# Patient Record
Sex: MALE | Race: OTHER | NOT HISPANIC OR LATINO | URBAN - METROPOLITAN AREA
[De-identification: names, ages, dates, MRNs, and addresses within clinical notes are randomized per-mention and may not be internally consistent; named-entity substitution may affect disease eponyms.]

---

## 2020-05-29 ENCOUNTER — EMERGENCY (EMERGENCY)
Facility: HOSPITAL | Age: 45
LOS: 1 days | Discharge: HOME | End: 2020-05-29
Attending: EMERGENCY MEDICINE | Admitting: EMERGENCY MEDICINE
Payer: MEDICAID

## 2020-05-29 VITALS
DIASTOLIC BLOOD PRESSURE: 97 MMHG | SYSTOLIC BLOOD PRESSURE: 138 MMHG | RESPIRATION RATE: 16 BRPM | TEMPERATURE: 98 F | OXYGEN SATURATION: 98 % | HEART RATE: 78 BPM

## 2020-05-29 VITALS — DIASTOLIC BLOOD PRESSURE: 91 MMHG | SYSTOLIC BLOOD PRESSURE: 141 MMHG

## 2020-05-29 PROCEDURE — 99284 EMERGENCY DEPT VISIT MOD MDM: CPT | Mod: 25

## 2020-05-29 PROCEDURE — 93010 ELECTROCARDIOGRAM REPORT: CPT

## 2020-05-29 RX ORDER — ASPIRIN/CALCIUM CARB/MAGNESIUM 324 MG
162 TABLET ORAL ONCE
Refills: 0 | Status: COMPLETED | OUTPATIENT
Start: 2020-05-29 | End: 2020-05-29

## 2020-05-29 NOTE — ED ADULT TRIAGE NOTE - CHIEF COMPLAINT QUOTE
c/o left sided chest pain radiating to back x 2 weeks. worsening today. not worse on exertion. denies associated symptoms. denies medical hx.

## 2020-05-30 LAB
ALBUMIN SERPL ELPH-MCNC: 4.4 G/DL — SIGNIFICANT CHANGE UP (ref 3.3–5)
ALP SERPL-CCNC: 66 U/L — SIGNIFICANT CHANGE UP (ref 40–120)
ALT FLD-CCNC: 11 U/L — SIGNIFICANT CHANGE UP (ref 4–41)
ANION GAP SERPL CALC-SCNC: 10 MMO/L — SIGNIFICANT CHANGE UP (ref 7–14)
AST SERPL-CCNC: 29 U/L — SIGNIFICANT CHANGE UP (ref 4–40)
BASOPHILS # BLD AUTO: 0.03 K/UL — SIGNIFICANT CHANGE UP (ref 0–0.2)
BASOPHILS NFR BLD AUTO: 0.4 % — SIGNIFICANT CHANGE UP (ref 0–2)
BILIRUB SERPL-MCNC: < 0.2 MG/DL — LOW (ref 0.2–1.2)
BUN SERPL-MCNC: 13 MG/DL — SIGNIFICANT CHANGE UP (ref 7–23)
CALCIUM SERPL-MCNC: 9.4 MG/DL — SIGNIFICANT CHANGE UP (ref 8.4–10.5)
CHLORIDE SERPL-SCNC: 102 MMOL/L — SIGNIFICANT CHANGE UP (ref 98–107)
CO2 SERPL-SCNC: 28 MMOL/L — SIGNIFICANT CHANGE UP (ref 22–31)
CREAT SERPL-MCNC: 1.07 MG/DL — SIGNIFICANT CHANGE UP (ref 0.5–1.3)
EOSINOPHIL # BLD AUTO: 0.11 K/UL — SIGNIFICANT CHANGE UP (ref 0–0.5)
EOSINOPHIL NFR BLD AUTO: 1.5 % — SIGNIFICANT CHANGE UP (ref 0–6)
GLUCOSE SERPL-MCNC: 90 MG/DL — SIGNIFICANT CHANGE UP (ref 70–99)
HCT VFR BLD CALC: 41.7 % — SIGNIFICANT CHANGE UP (ref 39–50)
HGB BLD-MCNC: 14 G/DL — SIGNIFICANT CHANGE UP (ref 13–17)
IMM GRANULOCYTES NFR BLD AUTO: 0.3 % — SIGNIFICANT CHANGE UP (ref 0–1.5)
LYMPHOCYTES # BLD AUTO: 3.21 K/UL — SIGNIFICANT CHANGE UP (ref 1–3.3)
LYMPHOCYTES # BLD AUTO: 42.5 % — SIGNIFICANT CHANGE UP (ref 13–44)
MCHC RBC-ENTMCNC: 29.7 PG — SIGNIFICANT CHANGE UP (ref 27–34)
MCHC RBC-ENTMCNC: 33.6 % — SIGNIFICANT CHANGE UP (ref 32–36)
MCV RBC AUTO: 88.3 FL — SIGNIFICANT CHANGE UP (ref 80–100)
MONOCYTES # BLD AUTO: 0.51 K/UL — SIGNIFICANT CHANGE UP (ref 0–0.9)
MONOCYTES NFR BLD AUTO: 6.8 % — SIGNIFICANT CHANGE UP (ref 2–14)
NEUTROPHILS # BLD AUTO: 3.67 K/UL — SIGNIFICANT CHANGE UP (ref 1.8–7.4)
NEUTROPHILS NFR BLD AUTO: 48.5 % — SIGNIFICANT CHANGE UP (ref 43–77)
NRBC # FLD: 0 K/UL — SIGNIFICANT CHANGE UP (ref 0–0)
PLATELET # BLD AUTO: 277 K/UL — SIGNIFICANT CHANGE UP (ref 150–400)
PMV BLD: 9.5 FL — SIGNIFICANT CHANGE UP (ref 7–13)
POTASSIUM SERPL-MCNC: 3.9 MMOL/L — SIGNIFICANT CHANGE UP (ref 3.5–5.3)
POTASSIUM SERPL-SCNC: 3.9 MMOL/L — SIGNIFICANT CHANGE UP (ref 3.5–5.3)
PROT SERPL-MCNC: 7.1 G/DL — SIGNIFICANT CHANGE UP (ref 6–8.3)
RBC # BLD: 4.72 M/UL — SIGNIFICANT CHANGE UP (ref 4.2–5.8)
RBC # FLD: 12.9 % — SIGNIFICANT CHANGE UP (ref 10.3–14.5)
SODIUM SERPL-SCNC: 140 MMOL/L — SIGNIFICANT CHANGE UP (ref 135–145)
TROPONIN T, HIGH SENSITIVITY: < 6 NG/L — SIGNIFICANT CHANGE UP (ref ?–14)
WBC # BLD: 7.55 K/UL — SIGNIFICANT CHANGE UP (ref 3.8–10.5)
WBC # FLD AUTO: 7.55 K/UL — SIGNIFICANT CHANGE UP (ref 3.8–10.5)

## 2020-05-30 PROCEDURE — 71046 X-RAY EXAM CHEST 2 VIEWS: CPT | Mod: 26

## 2020-05-30 RX ADMIN — Medication 162 MILLIGRAM(S): at 00:01

## 2020-05-30 NOTE — ED PROVIDER NOTE - NSFOLLOWUPINSTRUCTIONS_ED_ALL_ED_FT
Follow up with your primary doctor and a cardiologist as soon as possible. You can see Dr. Shanon Tong at  [phone] [place] [address] [address] [facility] [address] or if he does not take your insurance call  and ask for a Cardiologist. Advance activity as tolerated.  Continue all previously prescribed medications as directed.  Follow up with your primary care physician in 48-72 hours- bring copies of your results.  Return to the ER for worsening or persistent symptoms, and/or ANY NEW OR CONCERNING SYMPTOMS. If you have issues obtaining follow up, please call: [phone] to obtain a doctor or specialist who takes your insurance in your area.  You may call [phone] to make an appointment with the internal medicine clinic.

## 2020-05-30 NOTE — ED PROVIDER NOTE - CLINICAL SUMMARY MEDICAL DECISION MAKING FREE TEXT BOX
43 Y/O M denies PMH or PSH, non-smoker states that he has had L sided Chest pressure that has radiated to the back for the past 2 weeks. Pt denies change with exertion, pt states the pain is always present but will vary in intensity. EKG is normal, will check Trop to R/O ACS, Labs to eval for anemia or electrolyte disturbance. CXR ordered to eval for pneumothorax or other pulmonary findings. If neg will D/C with cardiology F/U.

## 2020-05-30 NOTE — ED PROVIDER NOTE - ATTENDING CONTRIBUTION TO CARE
44M p/w L side CP rad around to the back, pressure like, initial though it was muscular.  No P/P factors, didn't take meds.  Nonsmoker.  No prev cardiac w/u.  Never had pain like this.  previous h/o colorectal fistula.  EKG nonischemic.  Mild ttp to palpation, no rash.  Low risk CP - plan check labs, trops, CXR rx ASA; if all neg can f/u cards RTER for new/worsening symptoms.  VS:  unremarkable    GEN - NAD;   well appearing;   A+O x3   HEAD - NC/AT     ENT - PEERL, EOMI, mucous membranes    moist , no discharge      NECK: Neck supple, non-tender without lymphadenopathy, no masses, no JVD  PULM - CTA b/l,  symmetric breath sounds  COR -  normal heart sounds    ABD - , ND, NT, soft,  BACK - no CVA tenderness, nontender spine     EXTREMS - no edema, no deformity, warm and well perfused    SKIN - no rash    or bruising      NEUROLOGIC - alert, face symmetric, speech fluent, sensation nl, motor no focal deficit.

## 2020-05-30 NOTE — ED PROVIDER NOTE - OBJECTIVE STATEMENT
45 Y/O M denies PMH or PSH, non-smoker states that he has had L sided Chest pressure that has radiated to the back for the past 2 weeks. Pt denies change with exertion, pt states the pain is always present but will vary in intensity. Pt states the pain is currently mild but was more severe this afternoon. Pt is a non-smoker, states he does not know about his family history because he is not in touch with them. Pt denies SOB or H/O blood clots. PSH Colorectal fistula removal. Pt denies any other sx or acute complaints.

## 2020-05-30 NOTE — ED ADULT NURSE NOTE - NSIMPLEMENTINTERV_GEN_ALL_ED
Implemented All Universal Safety Interventions:  [place] to call system. Call bell, personal items and telephone within reach. Instruct patient to call for assistance. Room bathroom lighting operational. Non-slip footwear when patient is off stretcher. Physically safe environment: no spills, clutter or unnecessary equipment. Stretcher in lowest position, wheels locked, appropriate side rails in place.

## 2020-05-30 NOTE — ED PROVIDER NOTE - PATIENT PORTAL LINK FT
You can access the FollowMyHealth Patient Portal offered by [facility] by registering at the following website: [email or web address]. By joining [email or web address] FollowMyHealth portal, you will also be able to view your health information using other applications (apps) compatible with our system.

## 2020-11-06 PROBLEM — Z78.9 OTHER SPECIFIED HEALTH STATUS: Chronic | Status: ACTIVE | Noted: 2020-05-30

## 2020-11-08 PROBLEM — Z00.00 ENCOUNTER FOR PREVENTIVE HEALTH EXAMINATION: Status: ACTIVE | Noted: 2020-11-08

## 2020-11-09 ENCOUNTER — APPOINTMENT (OUTPATIENT)
Dept: ORTHOPEDIC SURGERY | Facility: CLINIC | Age: 45
End: 2020-11-09
Payer: MEDICAID

## 2020-11-09 VITALS — BODY MASS INDEX: 22.82 KG/M2 | WEIGHT: 142 LBS | HEIGHT: 66 IN

## 2020-11-09 VITALS — HEIGHT: 66 IN | BODY MASS INDEX: 22.92 KG/M2

## 2020-11-09 DIAGNOSIS — Z78.9 OTHER SPECIFIED HEALTH STATUS: ICD-10-CM

## 2020-11-09 DIAGNOSIS — M25.561 PAIN IN RIGHT KNEE: ICD-10-CM

## 2020-11-09 DIAGNOSIS — M25.562 PAIN IN RIGHT KNEE: ICD-10-CM

## 2020-11-09 PROCEDURE — 99072 ADDL SUPL MATRL&STAF TM PHE: CPT

## 2020-11-09 PROCEDURE — 99204 OFFICE O/P NEW MOD 45 MIN: CPT

## 2020-11-09 PROCEDURE — 73564 X-RAY EXAM KNEE 4 OR MORE: CPT | Mod: RT

## 2020-11-09 RX ORDER — MELOXICAM 15 MG/1
15 TABLET ORAL
Qty: 30 | Refills: 2 | Status: ACTIVE | COMMUNITY
Start: 2020-11-09 | End: 1900-01-01

## 2020-11-09 RX ORDER — CLOTRIMAZOLE 10 MG/G
1 CREAM TOPICAL
Qty: 28 | Refills: 0 | Status: ACTIVE | COMMUNITY
Start: 2020-09-22

## 2020-11-09 NOTE — HISTORY OF PRESENT ILLNESS
[de-identified] : This is very nice 45-year-old gentleman experiencing bilateral knee pain, which is mild in intensity. The pain mildly limits activities of daily living. Walking tolerance is not reduced.  He has pain in the anterior aspect of both knees.  He also complains of a right knee anterior mass that bothers him.  The masses been present for more than 2 years but it was not present before this.  There is no inciting event that caused this mass.  He feels like the mass is not getting bigger but it has a great anxiety about the mass being present and wants it removed.  He does not take any medications for the knee pain.  He has not tried physical therapy.  Has not tried bracing.  He does not use a cane or walker.  The patient denies any radiation of the pain to the feet and it is not associated with numbness, tingling, or weakness.

## 2020-11-09 NOTE — PHYSICAL EXAM
[de-identified] : Patient is well nourished, well-developed, in no acute distress, with appropriate mood and affect. The patient is oriented to time, place, and person. Respirations are even and unlabored. Gait evaluation does not reveal a limp. There is no inguinal adenopathy. The right limb is well-perfused and showed 2+ dp/pt pulses, without skin lesions, shows a grossly normal motor and sensory examination. Right knee motion is painless and the knee moves from 0 to 135 degrees. The knee is stable within that range-of-motion to AP and ML stress with a 1A Lachman, negative anterior or posterior drawer and no instability to varus or valgus stress. The alignment of the knee is 5 degrees varus. No effusion or crepitus is noted. No tenderness to palpation about the medial or lateral joint line, medial or lateral tibial plateau, medial or lateral femoral condyle, medial or lateral patellar facets, superior or inferior pole of the patella. Jayce's is negative.  A 1.5 cm mass is palpable in the anterior aspect of the knee and this prepatellar space.  The mass is mobile.  There is no tenderness palpation over the mass.  There is no overlying skin changes.  Muscle strength is normal. Pedal pulses are palpable. Hip examination was negative. The left limb is well-perfused and showed 2+ dp/pt pulses, without skin lesions, shows a grossly normal motor and sensory examination. Left knee motion is painless and the knee moves from 0 to 135 degrees. The knee is stable within that range-of-motion to AP and ML stress with a 1A Lachman, negative anterior or posterior drawer and no instability to varus or valgus stress. The alignment of the knee is 5 degrees varus. No effusion or crepitus is noted. No tenderness to palpation about the medial or lateral joint line, medial or lateral tibial plateau, medial or lateral femoral condyle, medial or lateral patellar facets, superior or inferior pole of the patella. Jayce's is negative. Muscle strength is normal. Pedal pulses are palpable. Hip examination was negative. [de-identified] : Long standing knee, AP knee, lateral knee, and patellar views of the bilateral knee were ordered and taken in the office and demonstrate no evidence of degenerative joint disease of the knee, fractures, intra-articular pathology.\par \par Patient brings with him an MRI of the right knee.  This demonstrates moderate medial compartment degenerative changes and meniscal fraying in the medial compartment.  The mass not visible on the MRI.\par \par The patient brings with him an MRI of the left knee which demonstrates mild degenerative changes at the free edge of the meniscus on the medial side.

## 2020-11-09 NOTE — DISCUSSION/SUMMARY
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  Also has an anterior knee mass.  An extensive discussion was conducted on the natural history of the disease and the variety of surgical and non-surgical options available to the patient including, but not limited to non-steroidal anti-inflammatory medications, steroid injections, physical therapy, maintenance of ideal body weight, and reduction of activity.  I recommended and prescribed a course of Mobic and physical therapy.  The patient is also encouraged to trial a neoprene sleeve knee brace which can be purchased OTC.  The patient is also encouraged to consider use of a cane.  In terms of the right knee he would like to proceed with a right knee open excision of the mass.  He understands that excising the mass will not improve the pain that he is feeling in the bilateral knees.  However he is worried about this mass and wants to make sure it is not cancer would like it removed.  The patient will schedule an appointment as needed.\par \par The patient is an appropriate candidate for consideration of right knee mass removal. An extensive discussion was conducted of the natural history of the disease and the variety of surgical and non-surgical treatment options available to the patient. A risk/benefit analysis was discussed with the patient reviewing the advantages and disadvantages of surgical intervention at this time. Both the level and length of the patient's pain have made additional conservative treatment measures consisting of NSAIDs, physical therapy, corticosteroids, and/or viscosupplementation contraindicated. A full explanation was given of the nature and the purpose of the procedure and anesthesia, its benefits, possible alternative methods of diagnosis or treatment, the risks involved, the possibility of complications, the foreseeable consequences of the procedure and the possible results of the non-treatment. \par \par No guarantee or assurance was made as to the results that may be obtained. Specifically, the risks were identified to include, but are not limited to the following: Infection, phlebitis, pulmonary embolism, death, paralysis, mass recurrence, pain, stiffness, limp, weakness, uncontrolled bleeding, nerve injury, blood vessel injury, pressure sores, and anesthetic risks. Further discussion was undertaken with the patient about the details of surgical preparation, treatment, and postoperative rehabilitation including medical clearance, autotransfusion, the hospital course, and the postoperative rehabilitation involved. All in all, I feel that this patient is a good candidate for surgical intervention for mass removal.\par \par The patient and I discussed the current SARS-CoV-2 (COVID-19) pandemic which has affected our local hospitals. We discussed that our hospitals treat patients with COVID-19. All efforts will be made to avoid cohorting the patient with diagnosed or suspected COVID-19 patient. They also understand that we will screen them 24-48 hours prior to surgery. Despite our best efforts, there is a potential risk for iatrogenic transmission of COVID-19 to the patient during the perioperative period. Adriana COVID-19 during the perioperative period may increase the patient´s risks of an adverse outcome including postoperative pneumonia, difficulty breathing, requirement for a breathing tube (general endotracheal intubation), and death. The patient is understanding of this risk, and is willing to proceed with surgery at this time.\par

## 2020-12-30 ENCOUNTER — OUTPATIENT (OUTPATIENT)
Dept: OUTPATIENT SERVICES | Facility: HOSPITAL | Age: 45
LOS: 1 days | End: 2020-12-30
Payer: COMMERCIAL

## 2020-12-30 VITALS
TEMPERATURE: 98 F | HEIGHT: 65 IN | SYSTOLIC BLOOD PRESSURE: 155 MMHG | OXYGEN SATURATION: 99 % | RESPIRATION RATE: 18 BRPM | WEIGHT: 145.06 LBS | DIASTOLIC BLOOD PRESSURE: 92 MMHG | HEART RATE: 79 BPM

## 2020-12-30 DIAGNOSIS — Z01.818 ENCOUNTER FOR OTHER PREPROCEDURAL EXAMINATION: ICD-10-CM

## 2020-12-30 DIAGNOSIS — K60.3 ANAL FISTULA: Chronic | ICD-10-CM

## 2020-12-30 DIAGNOSIS — M23.41 LOOSE BODY IN KNEE, RIGHT KNEE: ICD-10-CM

## 2020-12-30 DIAGNOSIS — M79.89 OTHER SPECIFIED SOFT TISSUE DISORDERS: ICD-10-CM

## 2020-12-30 LAB
HCT VFR BLD CALC: 47.9 % — SIGNIFICANT CHANGE UP (ref 39–50)
HGB BLD-MCNC: 16 G/DL — SIGNIFICANT CHANGE UP (ref 13–17)
MCHC RBC-ENTMCNC: 29.7 PG — SIGNIFICANT CHANGE UP (ref 27–34)
MCHC RBC-ENTMCNC: 33.4 GM/DL — SIGNIFICANT CHANGE UP (ref 32–36)
MCV RBC AUTO: 89 FL — SIGNIFICANT CHANGE UP (ref 80–100)
NRBC # BLD: 0 /100 WBCS — SIGNIFICANT CHANGE UP (ref 0–0)
PLATELET # BLD AUTO: 290 K/UL — SIGNIFICANT CHANGE UP (ref 150–400)
RBC # BLD: 5.38 M/UL — SIGNIFICANT CHANGE UP (ref 4.2–5.8)
RBC # FLD: 13 % — SIGNIFICANT CHANGE UP (ref 10.3–14.5)
WBC # BLD: 7.29 K/UL — SIGNIFICANT CHANGE UP (ref 3.8–10.5)
WBC # FLD AUTO: 7.29 K/UL — SIGNIFICANT CHANGE UP (ref 3.8–10.5)

## 2020-12-30 PROCEDURE — 85027 COMPLETE CBC AUTOMATED: CPT

## 2020-12-30 PROCEDURE — G0463: CPT

## 2020-12-30 RX ORDER — SODIUM CHLORIDE 9 MG/ML
3 INJECTION INTRAMUSCULAR; INTRAVENOUS; SUBCUTANEOUS EVERY 8 HOURS
Refills: 0 | Status: DISCONTINUED | OUTPATIENT
Start: 2021-01-13 | End: 2021-01-27

## 2020-12-30 RX ORDER — LIDOCAINE HCL 20 MG/ML
0.2 VIAL (ML) INJECTION ONCE
Refills: 0 | Status: DISCONTINUED | OUTPATIENT
Start: 2021-01-13 | End: 2021-01-27

## 2020-12-30 NOTE — H&P PST ADULT - HISTORY OF PRESENT ILLNESS
This is a  44 y/o Lithuanian speaking male with  no significant PMH c/o right knee mass for several years that has become painful.  Presents today for right knee removal of soft tissue mass. This is a 46 y/o Japanese speaking male with  no significant PMH c/o right knee mass for several years that has become painful.  Presents today for right knee removal of soft tissue mass.

## 2020-12-30 NOTE — H&P PST ADULT - NSICDXPROBLEM_GEN_ALL_CORE_FT
PROBLEM DIAGNOSES  Problem: Soft tissue mass  Assessment and Plan: Right knee removal of soft tissue mass

## 2021-01-10 ENCOUNTER — OUTPATIENT (OUTPATIENT)
Dept: OUTPATIENT SERVICES | Facility: HOSPITAL | Age: 46
LOS: 1 days | End: 2021-01-10
Payer: COMMERCIAL

## 2021-01-10 DIAGNOSIS — Z20.828 CONTACT WITH AND (SUSPECTED) EXPOSURE TO OTHER VIRAL COMMUNICABLE DISEASES: ICD-10-CM

## 2021-01-10 DIAGNOSIS — K60.3 ANAL FISTULA: Chronic | ICD-10-CM

## 2021-01-10 LAB — SARS-COV-2 RNA SPEC QL NAA+PROBE: SIGNIFICANT CHANGE UP

## 2021-01-10 PROCEDURE — U0005: CPT

## 2021-01-10 PROCEDURE — C9803: CPT

## 2021-01-10 PROCEDURE — U0003: CPT

## 2021-01-12 ENCOUNTER — TRANSCRIPTION ENCOUNTER (OUTPATIENT)
Age: 46
End: 2021-01-12

## 2021-01-13 ENCOUNTER — OUTPATIENT (OUTPATIENT)
Dept: OUTPATIENT SERVICES | Facility: HOSPITAL | Age: 46
LOS: 1 days | End: 2021-01-13
Payer: COMMERCIAL

## 2021-01-13 ENCOUNTER — RESULT REVIEW (OUTPATIENT)
Age: 46
End: 2021-01-13

## 2021-01-13 ENCOUNTER — APPOINTMENT (OUTPATIENT)
Dept: ORTHOPEDIC SURGERY | Facility: HOSPITAL | Age: 46
End: 2021-01-13

## 2021-01-13 VITALS
RESPIRATION RATE: 15 BRPM | SYSTOLIC BLOOD PRESSURE: 109 MMHG | HEART RATE: 78 BPM | OXYGEN SATURATION: 100 % | DIASTOLIC BLOOD PRESSURE: 67 MMHG

## 2021-01-13 VITALS
DIASTOLIC BLOOD PRESSURE: 84 MMHG | HEART RATE: 77 BPM | SYSTOLIC BLOOD PRESSURE: 143 MMHG | HEIGHT: 65 IN | TEMPERATURE: 97 F | WEIGHT: 145.06 LBS | RESPIRATION RATE: 18 BRPM | OXYGEN SATURATION: 100 %

## 2021-01-13 DIAGNOSIS — M23.41 LOOSE BODY IN KNEE, RIGHT KNEE: ICD-10-CM

## 2021-01-13 DIAGNOSIS — K60.3 ANAL FISTULA: Chronic | ICD-10-CM

## 2021-01-13 PROCEDURE — 88305 TISSUE EXAM BY PATHOLOGIST: CPT | Mod: 26

## 2021-01-13 PROCEDURE — 27327 EXC THIGH/KNEE LES SC < 3 CM: CPT | Mod: RT

## 2021-01-13 PROCEDURE — 88305 TISSUE EXAM BY PATHOLOGIST: CPT

## 2021-01-13 RX ORDER — NAPROXEN 500 MG/1
500 TABLET ORAL
Qty: 30 | Refills: 0 | Status: ACTIVE | COMMUNITY
Start: 2021-01-13 | End: 1900-01-01

## 2021-01-13 RX ORDER — OXYCODONE 5 MG/1
5 TABLET ORAL
Qty: 40 | Refills: 0 | Status: ACTIVE | COMMUNITY
Start: 2021-01-13 | End: 1900-01-01

## 2021-01-13 RX ORDER — ONDANSETRON 8 MG/1
4 TABLET, FILM COATED ORAL ONCE
Refills: 0 | Status: DISCONTINUED | OUTPATIENT
Start: 2021-01-13 | End: 2021-01-27

## 2021-01-13 RX ORDER — SODIUM CHLORIDE 9 MG/ML
1000 INJECTION, SOLUTION INTRAVENOUS
Refills: 0 | Status: DISCONTINUED | OUTPATIENT
Start: 2021-01-13 | End: 2021-01-27

## 2021-01-13 RX ORDER — OXYCODONE HYDROCHLORIDE 5 MG/1
5 TABLET ORAL ONCE
Refills: 0 | Status: DISCONTINUED | OUTPATIENT
Start: 2021-01-13 | End: 2021-01-13

## 2021-01-13 RX ORDER — CHLORHEXIDINE GLUCONATE 213 G/1000ML
1 SOLUTION TOPICAL ONCE
Refills: 0 | Status: COMPLETED | OUTPATIENT
Start: 2021-01-13 | End: 2021-01-13

## 2021-01-13 RX ORDER — PANTOPRAZOLE 40 MG/1
40 TABLET, DELAYED RELEASE ORAL DAILY
Qty: 14 | Refills: 0 | Status: ACTIVE | COMMUNITY
Start: 2021-01-13 | End: 1900-01-01

## 2021-01-13 RX ORDER — ASPIRIN 81 MG/1
81 TABLET ORAL
Qty: 28 | Refills: 0 | Status: ACTIVE | COMMUNITY
Start: 2021-01-13 | End: 1900-01-01

## 2021-01-13 RX ADMIN — CHLORHEXIDINE GLUCONATE 1 APPLICATION(S): 213 SOLUTION TOPICAL at 11:43

## 2021-01-13 NOTE — ASU DISCHARGE PLAN (ADULT/PEDIATRIC) - CARE PROVIDER_API CALL
Benja Dean)  Orthopedics  [address], [address]  [place] [address]  Phone: [phone]  Fax: [phone]  Follow Up Time:

## 2021-01-13 NOTE — ASU DISCHARGE PLAN (ADULT/PEDIATRIC) - PAIN MANAGEMENT
Prescription called to pharmacy may take tylenol for pain use as directed next dose @ 8pm/Prescription called to pharmacy

## 2021-01-13 NOTE — ASU DISCHARGE PLAN (ADULT/PEDIATRIC) - ASU DC SPECIAL INSTRUCTIONSFT
Follow up with Dr. Dean in 10-14 days. Call office for appointment. Take medications as prescribed, pain Rx called to your pharmacy. Keep dressing clean, dry, and intact until your follow up. Ok to shower, no bathing. Rest, ice, and elevate affected extremity.

## 2021-01-14 ENCOUNTER — NON-APPOINTMENT (OUTPATIENT)
Age: 46
End: 2021-01-14

## 2021-01-26 ENCOUNTER — APPOINTMENT (OUTPATIENT)
Dept: ORTHOPEDIC SURGERY | Facility: CLINIC | Age: 46
End: 2021-01-26
Payer: MEDICAID

## 2021-01-26 VITALS — TEMPERATURE: 97.2 F

## 2021-01-26 DIAGNOSIS — M23.41 LOOSE BODY IN KNEE, RIGHT KNEE: ICD-10-CM

## 2021-01-26 PROCEDURE — 99024 POSTOP FOLLOW-UP VISIT: CPT

## 2021-01-26 NOTE — DISCUSSION/SUMMARY
[de-identified] : The patient is doing well after removal of loose body. Written infectious precautions were reviewed.  Return around the 6 week anniversary from surgery for follow-up evaluation. \par \par The patient returns to the office today for suture removal from the right knee wound. The sutures have been in since surgery. The patient has no complaints. The wound is healing well and is without evidence of infection or wound dehiscence. The wound was prepped with betadine and sutures were removed. Steri strips were placed over the wound and the patient was instructed to leave these in place until they fall off on their own. The patient tolerated the procedure well and there were no immediate complications. The wound edges are well adhered. The patient was instructed to continue to keep it clean.

## 2021-01-26 NOTE — PHYSICAL EXAM
[de-identified] : Patient is well nourished, well-developed, in no acute distress, with appropriate mood and affect. The patient is oriented to time, place, and person. Respirations are even and unlabored. Gait evaluation does not reveal a limp. There is no inguinal adenopathy. Examination of the contralateral knee shows normal range of motion, strength, no tenderness, and intact skin. The affected limb is well-perfused and showed 2+ dp/pt pulses, without skin lesions, shows a grossly normal motor and sensory examination. Knee motion is painless and the knee moves from 0-130 degrees. The knee is stable within that range-of-motion to AP and ML stress with a 1A Lachman, negative anterior or posterior drawer and no instability to varus or valgus stress. The alignment of the knee is neutral. No effusion or crepitus is noted. No tenderness to palpation about the medial or lateral joint line, medial or lateral tibial plateau, medial or lateral femoral condyle, medial or lateral patellar facets, superior or inferior pole of the patella. Jayce's is negative. Muscle strength is normal. Pedal pulses are palpable. Hip examination was negative. Wound healing well.

## 2022-12-15 NOTE — H&P PST ADULT - NS PRO ABUSE SCREEN SUSPICION NEGLECT YN
Detail Level: Simple Size Of Lesion: 1mm Morphology Per Location (Optional): brown papule Body Location Override (Optional - Billing Will Still Be Based On Selected Body Map Location If Applicable): left inferior posterior helix Detail Level: Zone Size Of Lesion In Cm (Optional): 0 Body Location Override (Optional - Billing Will Still Be Based On Selected Body Map Location If Applicable): left back no

## 2024-10-14 NOTE — PRE-ANESTHESIA EVALUATION ADULT - NSANTHOSAYNRD_GEN_A_CORE
October 14, 2024     Patient: Tomas Harris   YOB: 1991   Date of Visit: 10/14/2024       To Whom It May Concern:    It is my medical opinion that Tomas Harris may return to work on 10/17/2024 .    If you have any questions or concerns, please don't hesitate to call.         Sincerely,        CLARY Garcia    CC: No Recipients  
No. LUCIA screening performed.  STOP BANG Legend: 0-2 = LOW Risk; 3-4 = INTERMEDIATE Risk; 5-8 = HIGH Risk